# Patient Record
Sex: MALE | URBAN - METROPOLITAN AREA
[De-identification: names, ages, dates, MRNs, and addresses within clinical notes are randomized per-mention and may not be internally consistent; named-entity substitution may affect disease eponyms.]

---

## 2018-07-13 ENCOUNTER — APPOINTMENT (RX ONLY)
Dept: URBAN - METROPOLITAN AREA OTHER 12 | Facility: OTHER | Age: 23
Setting detail: DERMATOLOGY
End: 2018-07-13

## 2018-07-13 DIAGNOSIS — L20.89 OTHER ATOPIC DERMATITIS: ICD-10-CM

## 2018-07-13 DIAGNOSIS — L28.1 PRURIGO NODULARIS: ICD-10-CM

## 2018-07-13 PROBLEM — L30.1 DYSHIDROSIS [POMPHOLYX]: Status: ACTIVE | Noted: 2018-07-13

## 2018-07-13 PROCEDURE — ? OTHER

## 2018-07-13 PROCEDURE — ? COUNSELING

## 2018-07-13 PROCEDURE — 99213 OFFICE O/P EST LOW 20 MIN: CPT

## 2018-07-13 PROCEDURE — ? PRESCRIPTION

## 2018-07-13 PROCEDURE — ? TREATMENT REGIMEN

## 2018-07-13 RX ORDER — TRIAMCINOLONE ACETONIDE 1 MG/G
OINTMENT TOPICAL
Qty: 1 | Refills: 2 | Status: ERX | COMMUNITY
Start: 2018-07-13

## 2018-07-13 RX ADMIN — TRIAMCINOLONE ACETONIDE -: 1 OINTMENT TOPICAL at 00:00

## 2018-07-13 ASSESSMENT — LOCATION ZONE DERM
LOCATION ZONE: HAND
LOCATION ZONE: ARM
LOCATION ZONE: LEG
LOCATION ZONE: FEET

## 2018-07-13 ASSESSMENT — LOCATION SIMPLE DESCRIPTION DERM
LOCATION SIMPLE: LEFT ANKLE
LOCATION SIMPLE: RIGHT HAND
LOCATION SIMPLE: RIGHT ELBOW
LOCATION SIMPLE: LEFT PRETIBIAL REGION
LOCATION SIMPLE: RIGHT PRETIBIAL REGION
LOCATION SIMPLE: RIGHT FOOT
LOCATION SIMPLE: LEFT FOOT
LOCATION SIMPLE: LEFT FOREARM
LOCATION SIMPLE: LEFT HAND
LOCATION SIMPLE: RIGHT FOREARM

## 2018-07-13 ASSESSMENT — LOCATION DETAILED DESCRIPTION DERM
LOCATION DETAILED: LEFT DISTAL PRETIBIAL REGION
LOCATION DETAILED: RIGHT ELBOW
LOCATION DETAILED: LEFT ANKLE
LOCATION DETAILED: LEFT RADIAL DORSAL HAND
LOCATION DETAILED: LEFT DORSAL FOOT
LOCATION DETAILED: RIGHT VENTRAL PROXIMAL FOREARM
LOCATION DETAILED: LEFT VENTRAL PROXIMAL FOREARM
LOCATION DETAILED: RIGHT DISTAL PRETIBIAL REGION
LOCATION DETAILED: RIGHT DORSAL FOOT
LOCATION DETAILED: LEFT PROXIMAL DORSAL FOREARM
LOCATION DETAILED: RIGHT ULNAR DORSAL HAND

## 2018-07-13 NOTE — PROCEDURE: TREATMENT REGIMEN
Plan: Discussed NB UVB as future treatment option
Continue Regimen: Clindamycin per ER instructions
Detail Level: Zone
Discontinue Regimen: Neosporin, Bag balm and Eucerin
Initiate Treatment: Triamcinolone 0.1%: apply BID to affected areas
Otc Regimen: Vaseline only for moisturizer and CeraVe cleanser
Initiate Treatment: TAC 0.1%: apply BID to affected areas

## 2018-07-13 NOTE — HPI: RASH (ATOPIC DERMATITIS)
How Severe Is Your Atopic Dermatitis?: severe
Is This A New Presentation, Or A Follow-Up?: Rash
Additional History: The patient is currently taking clindamycin orally and acetaminophen-codeine for pain. He has had a history of eczema since he was a child.

## 2018-07-13 NOTE — PROCEDURE: OTHER
Detail Level: Simple
Other (Free Text): Recommend patient follow up with [facility] as last visit was greater than 7 years
Note Text (......Xxx Chief Complaint.): This diagnosis correlates with the
Other (Free Text): Patient actively picking lesions during visit

## 2018-08-14 ENCOUNTER — APPOINTMENT (RX ONLY)
Dept: URBAN - METROPOLITAN AREA OTHER 12 | Facility: OTHER | Age: 23
Setting detail: DERMATOLOGY
End: 2018-08-14

## 2018-08-14 DIAGNOSIS — L259 CONTACT DERMATITIS AND OTHER ECZEMA, UNSPECIFIED CAUSE: ICD-10-CM

## 2018-08-14 PROBLEM — L30.8 OTHER SPECIFIED DERMATITIS: Status: ACTIVE | Noted: 2018-08-14

## 2018-08-14 PROCEDURE — ? TREATMENT REGIMEN

## 2018-08-14 PROCEDURE — 99213 OFFICE O/P EST LOW 20 MIN: CPT

## 2018-08-14 NOTE — PROCEDURE: TREATMENT REGIMEN
Plan: ARNAUD signed to obtain records and labs.  Keep follow up with Dr. Gómez and follow up with us in one month
Continue Regimen: Moisturizer BID-CeraVe or Cetaphil BID\\nTAC ointment BID prn flares\\nClindamycin as prescribed by Dr. Gómez
Initiate Treatment: CeraVe cream moisturizer 1-2x daily
Detail Level: Zone

## 2018-10-11 ENCOUNTER — APPOINTMENT (RX ONLY)
Dept: URBAN - METROPOLITAN AREA OTHER 12 | Facility: OTHER | Age: 23
Setting detail: DERMATOLOGY
End: 2018-10-11

## 2018-10-11 DIAGNOSIS — L85.3 XEROSIS CUTIS: ICD-10-CM

## 2018-10-11 DIAGNOSIS — L259 CONTACT DERMATITIS AND OTHER ECZEMA, UNSPECIFIED CAUSE: ICD-10-CM

## 2018-10-11 PROBLEM — L30.8 OTHER SPECIFIED DERMATITIS: Status: ACTIVE | Noted: 2018-10-11

## 2018-10-11 PROCEDURE — ? PRESCRIPTION

## 2018-10-11 PROCEDURE — ? TREATMENT REGIMEN

## 2018-10-11 PROCEDURE — 99213 OFFICE O/P EST LOW 20 MIN: CPT

## 2018-10-11 PROCEDURE — ? COUNSELING

## 2018-10-11 PROCEDURE — ? ADDITIONAL NOTES

## 2018-10-11 RX ORDER — CRISABOROLE 20 MG/G
OINTMENT TOPICAL
Qty: 2 | Refills: 3 | Status: ERX | COMMUNITY
Start: 2018-10-11

## 2018-10-11 RX ADMIN — CRISABOROLE -: 20 OINTMENT TOPICAL at 00:00

## 2018-10-11 ASSESSMENT — LOCATION SIMPLE DESCRIPTION DERM: LOCATION SIMPLE: LEFT HAND

## 2018-10-11 ASSESSMENT — LOCATION DETAILED DESCRIPTION DERM: LOCATION DETAILED: LEFT ULNAR DORSAL HAND

## 2018-10-11 ASSESSMENT — LOCATION ZONE DERM: LOCATION ZONE: HAND

## 2018-10-11 NOTE — PROCEDURE: TREATMENT REGIMEN
Continue Regimen: TAC 0.1% when flared
Detail Level: Zone
Continue Regimen: Vaseline multiple times daily
Initiate Treatment: CeraVe cream BID

## 2018-10-11 NOTE — PROCEDURE: ADDITIONAL NOTES
Additional Notes: Patient is to follow up with MICHAELA Barnes for refills of Methotrexate and Clindamycin\\nPatient signed ARNAUD for records to be sent to his home address
Detail Level: Zone

## 2019-07-16 ENCOUNTER — APPOINTMENT (RX ONLY)
Dept: URBAN - METROPOLITAN AREA OTHER 12 | Facility: OTHER | Age: 24
Setting detail: DERMATOLOGY
End: 2019-07-16

## 2019-07-16 DIAGNOSIS — R79.89 OTHER SPECIFIED ABNORMAL FINDINGS OF BLOOD CHEMISTRY: ICD-10-CM

## 2019-07-16 DIAGNOSIS — L20.89 OTHER ATOPIC DERMATITIS: ICD-10-CM

## 2019-07-16 DIAGNOSIS — L30.8 OTHER SPECIFIED DERMATITIS: ICD-10-CM

## 2019-07-16 DIAGNOSIS — L85.3 XEROSIS CUTIS: ICD-10-CM

## 2019-07-16 PROCEDURE — 99213 OFFICE O/P EST LOW 20 MIN: CPT

## 2019-07-16 PROCEDURE — ? PATIENT SPECIFIC COUNSELING

## 2019-07-16 PROCEDURE — ? OTHER

## 2019-07-16 PROCEDURE — ? TREATMENT REGIMEN

## 2019-07-16 PROCEDURE — ? LAB REPORTS REVIEWED

## 2019-07-16 PROCEDURE — ? COUNSELING

## 2019-07-16 PROCEDURE — ? PRESCRIPTION

## 2019-07-16 RX ORDER — HALOBETASOL PROPIONATE 0.5 MG/G
- CREAM TOPICAL
Qty: 1 | Refills: 3 | Status: ERX | COMMUNITY
Start: 2019-07-16

## 2019-07-16 RX ORDER — HALOBETASOL PROPIONATE OINTMENT 0.5 MG/G
- OINTMENT TOPICAL
Qty: 1 | Refills: 3 | Status: ERX | COMMUNITY
Start: 2019-07-16

## 2019-07-16 RX ORDER — CEPHALEXIN 500 MG/1
- CAPSULE ORAL TID
Qty: 30 | Refills: 0 | Status: ERX | COMMUNITY
Start: 2019-07-16

## 2019-07-16 RX ADMIN — CEPHALEXIN -: 500 CAPSULE ORAL at 22:44

## 2019-07-16 RX ADMIN — HALOBETASOL PROPIONATE -: 0.5 CREAM TOPICAL at 22:45

## 2019-07-16 RX ADMIN — HALOBETASOL PROPIONATE OINTMENT -: 0.5 OINTMENT TOPICAL at 22:46

## 2019-07-16 ASSESSMENT — LOCATION DETAILED DESCRIPTION DERM
LOCATION DETAILED: RIGHT ULNAR DORSAL HAND
LOCATION DETAILED: RIGHT THENAR EMINENCE
LOCATION DETAILED: RIGHT RADIAL DORSAL HAND
LOCATION DETAILED: LEFT DORSAL WRIST
LOCATION DETAILED: LEFT KNEE
LOCATION DETAILED: LEFT ULNAR DORSAL HAND
LOCATION DETAILED: RIGHT KNEE
LOCATION DETAILED: RIGHT DORSAL FOOT
LOCATION DETAILED: LEFT THENAR EMINENCE
LOCATION DETAILED: RIGHT DORSAL WRIST
LOCATION DETAILED: LEFT DORSAL FOOT
LOCATION DETAILED: LEFT INFERIOR MEDIAL UPPER BACK
LOCATION DETAILED: LEFT DISTAL PRETIBIAL REGION
LOCATION DETAILED: RIGHT DISTAL PRETIBIAL REGION

## 2019-07-16 ASSESSMENT — LOCATION SIMPLE DESCRIPTION DERM
LOCATION SIMPLE: LEFT KNEE
LOCATION SIMPLE: RIGHT WRIST
LOCATION SIMPLE: RIGHT FOOT
LOCATION SIMPLE: LEFT PRETIBIAL REGION
LOCATION SIMPLE: LEFT FOOT
LOCATION SIMPLE: LEFT HAND
LOCATION SIMPLE: LEFT UPPER BACK
LOCATION SIMPLE: LEFT WRIST
LOCATION SIMPLE: RIGHT PRETIBIAL REGION
LOCATION SIMPLE: RIGHT KNEE
LOCATION SIMPLE: RIGHT HAND

## 2019-07-16 ASSESSMENT — LOCATION ZONE DERM
LOCATION ZONE: HAND
LOCATION ZONE: TRUNK
LOCATION ZONE: ARM
LOCATION ZONE: FEET
LOCATION ZONE: LEG

## 2019-07-16 NOTE — HPI: RASH
How Severe Is Your Rash?: moderate
Is This A New Presentation, Or A Follow-Up?: Follow Up Rash
Additional History: Patient states he was diagnosed with Lupus by MICHAELA Barnes after she ordered bloodwork to confirm. Patient states Morgan Newell had prescribed MTX and Clindamycin. He discontinued MTX after 1 month because he was concerned about negative systemic side effects. He ran out of Clindamycin pills several months ago but states he did see improvement while he was taking them. He had been using Eucrisa and Triamcinolone ointment with mild improvement but he ran out of refills 2-3 months ago.  Patient is unable to work due to the severity of rash and fissures on his hands.  Patients mother states patient has history of recurrent rashes since he was born. He has a history of allergies, though no recent contact testing.  +FHx atopy- he has family history of asthma. He has seen several dermatologists, and has used numerous topicals.  He did not note improvement with the limited use of MTX. He notes his hands are routinely exposed to potential irritants and allergens, and does not note a routine hand care program. They note worsening when exposed, but no other photo-exposed areas- face, neck- erupt. Patient is experiencing diffuse erythema, swelling primarily in his hands. He denies specific prolonged AM joint stiffness, dry eyes, dry mouth or trouble with poor dentition. No h/o neuro, cardiac, hepatic or renal disease. No FHx of connective tissue disease. Patient would like a referral to [facility] rheumatology.  He notes hands and feet predominately involved with dermatitis. Face is spared.

## 2019-07-16 NOTE — PROCEDURE: TREATMENT REGIMEN
Otc Regimen: Dilute bleach baths BIW or CLN cleanser
Detail Level: Zone
Plan: Patient's dermatitis at present more c/w atopic dermatitis/eczema with very heavy colonization/secondary infection and probable significant contribution of allergen/irritant exposure with chronic contact dermatitis.  Findings on exam at present are not grossly c/w more classic cutaneous manifestations of connective tissue disease. Could consider biopsy in future, when not secondarily infected, pending course. Will initiate referral to [facility] for allergy testing and management, and to rheumatology for additional evaluation of status, serologies\\nSee also hand dermatitis, below\\nReferral to [facility] and [facility]. Patient will RTC in 4-6 weeks for recheck.
Otc Regimen: CeraVe moisturizer daily. Advised against using Cetaphil products due past history of nut allergies

## 2019-07-16 NOTE — PROCEDURE: LAB REPORTS REVIEWED
Summarized Lab Results: elevated esr- 48, elevated tpro(8.2), globulin (4.1), wbc(15.4), neutrophilia(31674); PTT-LA 76(nl <40), elevated DRVVT 61 (nl<45), low Fe, %sat; SSA neg, SSB 2.1 (+ >1.0), FELA IFA screen negative; skin culture + group A strep. \\nAn allergy 'small [place] panel' via [facility] reports + to cat dander, dog dander, rye/grasses, dust mites, birch.\\n
Detail Level: Zone
Labs Reviewed Override: see data below

## 2019-07-16 NOTE — PROCEDURE: OTHER
Note Text (......Xxx Chief Complaint.): This diagnosis correlates with the
Detail Level: Simple
Other (Free Text): See labs reviewed, above.  CBC/WBC c/w secondary inflation, elevated t.pro/globulin may be seen with chronic inflammation. Other serologies, coags, as above - referral to [facility], as noted

## 2019-07-16 NOTE — PROCEDURE: COUNSELING
Detail Level: Zone
Patient Specific Counseling (Will Not Stick From Patient To Patient): Imperative of a routine, hypoallergenic skin care, maintenance reviewed
Detail Level: Generalized